# Patient Record
Sex: MALE | Race: WHITE | NOT HISPANIC OR LATINO | Employment: OTHER | URBAN - METROPOLITAN AREA
[De-identification: names, ages, dates, MRNs, and addresses within clinical notes are randomized per-mention and may not be internally consistent; named-entity substitution may affect disease eponyms.]

---

## 2018-01-10 NOTE — PATIENT DISCUSSION
keep appt with Derek. OCT today shows good resolution of fluid without changes from up [address]. Poss maintance dose. Hold on YAG OD for now.

## 2018-01-12 NOTE — PATIENT DISCUSSION
No fluid on exam today. Recommend treat and extend. Pt will return in 2 weeks for Eylea injection #3.

## 2018-01-24 NOTE — PROCEDURE NOTE: CLINICAL
PROCEDURE NOTE: Eylea #3 OS. Diagnosis: Neovascular AMD with Inactive CNV. Prep: Betadine Drops and Betadine Scrub. Prior to injection, risks/benefits/alternatives discussed including corneal abrasion, infection, loss of vision, hemorrhage, cataract, glaucoma, retinal tears or detachment. A written consent is on file, and the need for today’s injection was discussed and the patient is understanding and wishes to proceed. The entire vial of Eylea was drawn up into a syringe. The opened vial, remaining drug, and filtered needle were disposed of in a certified biohazard container. Betadine prep was performed. Topical anesthesia was induced with Alcaine. 4% lidocaine pledge. A lid speculum was used. A short 30g needle on a 1cc syringe was used with product that that had previously been prepared under sterile conditions. Injection site: 3-4 mm from the limbus. The used syringe/needle was transferred to a biohazard container. Lid speculum removed. Mask worn during procedure. Patient tolerated procedure well. Count fingers vision was verified. There were no complications. Patient was given the standard instruction sheet. Patient given office phone number/answering service number and advised to call immediately should there be loss of vision or pain, or should they have any other questions or concerns. Dariel Pope

## 2018-03-09 NOTE — PATIENT DISCUSSION
Pt edu still inactive. Due to high tendency to return and chronic condition Recc Augustina # 4 in: 2-3 weeks.

## 2018-03-28 NOTE — PATIENT DISCUSSION
Recommended injection #4 of Eylea. The injection was given and tolerated well by patient. Post-injection instructions were reviewed and understood by the patient.

## 2018-03-28 NOTE — PROCEDURE NOTE: CLINICAL
PROCEDURE NOTE: Eylea #4 OS. Diagnosis: Neovascular AMD with Inactive CNV. Prep: Betadine Drops and Betadine Scrub. Prior to injection, risks/benefits/alternatives discussed including corneal abrasion, infection, loss of vision, hemorrhage, cataract, glaucoma, retinal tears or detachment. A written consent is on file, and the need for today’s injection was discussed and the patient is understanding and wishes to proceed. The entire vial of Eylea was drawn up into a syringe. The opened vial, remaining drug, and filtered needle were disposed of in a certified biohazard container. Betadine prep was performed. Topical anesthesia was induced with Alcaine. 4% lidocaine pledge. A lid speculum was used. A short 30g needle on a 1cc syringe was used with product that that had previously been prepared under sterile conditions. Injection site: 3-4 mm from the limbus. The used syringe/needle was transferred to a biohazard container. Lid speculum removed. Mask worn during procedure. Patient tolerated procedure well. Count fingers vision was verified. There were no complications. Patient was given the standard instruction sheet. Patient given office phone number/answering service number and advised to call immediately should there be loss of vision or pain, or should they have any other questions or concerns. Ewelina Hansen

## 2018-04-30 NOTE — PATIENT DISCUSSION
okay to continue Lotemax Qday OD indefinitely ; mid stromal Cholesterol deposits but no active infiltrates . NO ACTIVITY TODAY.

## 2018-05-17 NOTE — PATIENT DISCUSSION
Pt leaving to go [facility] May 19th. See's Dr. Merlene Olson @ [facility]. Copy of records given to patient.

## 2018-05-17 NOTE — PATIENT DISCUSSION
No activity seen on exam today. Okay to continue Lotemax Qday OD indefinitely for any pain or discomfort; mid stromal Cholesterol deposits but no active infiltrates .

## 2018-05-17 NOTE — PATIENT DISCUSSION
Pt edu still inactive. No treatment recommended before patient leaves for [place]. Recommend seeing Dr. Giulia Palumbo as scheduled next week. May need treatment in future if worsens. Stressed chronic condition, no cure.

## 2018-11-08 NOTE — PATIENT DISCUSSION
Pt edu still inactive. Do not have records from [facility]. Pt states she received 3 injections [address] about 12 weeks apart. Will continue with same interval. Will schedule a series of 2 Eylea, 12 weeks apart.

## 2018-11-17 NOTE — PATIENT DISCUSSION
Recommended OBSERVATION. normal... Well appearing, well nourished, awake, alert, oriented to person, place, time/situation and in no apparent distress.

## 2019-01-16 NOTE — PATIENT DISCUSSION
Recommended injection of Eylea #8 (1 of 2). The injection was given and tolerated well by patient. Post-injection instructions were reviewed and understood by the patient.

## 2019-01-16 NOTE — PROCEDURE NOTE: CLINICAL
PROCEDURE NOTE: Eylea 2mg ( 1 of 2) #8 OS. Diagnosis: Neovascular AMD with Inactive CNV. Prior to injection, risks/benefits/alternatives discussed including corneal abrasion, infection, loss of vision, hemorrhage, cataract, glaucoma, retinal tears or detachment. A written consent is on file, and the need for today’s injection was discussed and the patient is understanding and wishes to proceed. The entire vial of Eylea was drawn up into a syringe. The opened vial, remaining drug, and filtered needle were disposed of in a certified biohazard container. Betadine prep was performed. Topical anesthesia was induced with Alcaine. 4% lidocaine pledge. A lid speculum was used. A short 30g needle on a 1cc syringe was used with product that that had previously been prepared under sterile conditions. Injection site: 3-4 mm from the limbus. The used syringe/needle was transferred to a biohazard container. Lid speculum removed. Mask worn during procedure. Patient tolerated procedure well. Count fingers vision was verified. There were no complications. Patient was given the standard instruction sheet. Patient given office phone number/answering service number and advised to call immediately should there be loss of vision or pain, or should they have any other questions or concerns. John Guzmán

## 2019-04-10 NOTE — PATIENT DISCUSSION
Pt here for injection of Eylea #9 (2 of 2). The injection was given and tolerated well by patient. Post-injection instructions were reviewed and understood by the patient.

## 2019-04-10 NOTE — PATIENT DISCUSSION
Pt has appt in July to see her up [address], Dr. Kaleigh Davies that is too far out, rec pt f/u with Dr. Silvestre Santos in 2 months or sooner PRN.

## 2019-04-10 NOTE — PROCEDURE NOTE: CLINICAL
PROCEDURE NOTE: Eylea 2mg ( 2 of 2) #9 OS. Diagnosis: Neovascular AMD with Inactive CNV. Prior to injection, risks/benefits/alternatives discussed including corneal abrasion, infection, loss of vision, hemorrhage, cataract, glaucoma, retinal tears or detachment. A written consent is on file, and the need for today’s injection was discussed and the patient is understanding and wishes to proceed. The entire vial of Eylea was drawn up into a syringe. The opened vial, remaining drug, and filtered needle were disposed of in a certified biohazard container. Betadine prep was performed. Topical anesthesia was induced with Alcaine. 4% lidocaine pledge. A lid speculum was used. A short 30g needle on a 1cc syringe was used with product that that had previously been prepared under sterile conditions. Injection site: 3-4 mm from the limbus. The used syringe/needle was transferred to a biohazard container. Lid speculum removed. Mask worn during procedure. Patient tolerated procedure well. Count fingers vision was verified. There were no complications. Patient was given the standard instruction sheet. Patient given office phone number/answering service number and advised to call immediately should there be loss of vision or pain, or should they have any other questions or concerns. [facility]#[id number] exp 10/2019.

## 2019-11-07 NOTE — PATIENT DISCUSSION
Discussed Anti-VEGF treatment risks/benefits/alternatives including continuing current treatment interval vs extension vs observation.

## 2019-11-07 NOTE — PATIENT DISCUSSION
The patient notes gradual worsening of visual acuity. This is likely due to progressive atrophy and unfortunately is not treatable.  Pt will need extra light when reading and encouraged to use Tameka.

## 2019-11-20 NOTE — PROCEDURE NOTE: CLINICAL
PROCEDURE NOTE: Eylea 2mg (1 of 2) #13 OS. Diagnosis: Neovascular AMD with Active CNV. Prior to injection, risks/benefits/alternatives discussed including corneal abrasion, infection, loss of vision, hemorrhage, cataract, glaucoma, retinal tears or detachment. A written consent is on file, and the need for today’s injection was discussed and the patient is understanding and wishes to proceed. The entire vial of Eylea was drawn up into a syringe. The opened vial, remaining drug, and filtered needle were disposed of in a certified biohazard container. Betadine prep was performed. Topical anesthesia was induced with Alcaine. 4% lidocaine pledge. A lid speculum was used. A short 30g needle on a 1cc syringe was used with product that that had previously been prepared under sterile conditions. Injection site: 3-4 mm from the limbus. The used syringe/needle was transferred to a biohazard container. Lid speculum removed. Mask worn during procedure. Patient tolerated procedure well. Count fingers vision was verified. There were no complications. Patient was given the standard instruction sheet. Patient given office phone number/answering service number and advised to call immediately should there be loss of vision or pain, or should they have any other questions or concerns. Partha Pritchett

## 2020-01-08 NOTE — PROCEDURE NOTE: CLINICAL
PROCEDURE NOTE: Eylea Prefilled Syringe (2 of 2) #14 OS. Diagnosis: Neovascular AMD with Active CNV. Prior to injection, risks/benefits/alternatives discussed including corneal abrasion, infection, loss of vision, hemorrhage, cataract, glaucoma, retinal tears or detachment. A written consent is on file, and the need for today's injection was discussed and the patient is understanding and wishes to proceed. A 30G needle was placed on an Eylea 2mg/0.05ml Pre-filled Syringe. Betadine prep was performed. Topical anesthesia was induced with Alcaine. 4% lidocaine pledge. A lid speculum was used. An intravitreal injection of Eylea was given. Injection site: 3-4 mm from the limbus. The used syringe/needle was transferred to a biohazard container. Lid speculum removed. Mask worn during procedure. Patient tolerated procedure well. Count fingers vision was verified. There were no complications. Patient was given the standard instruction sheet. Jessica Velazquez PROCEDURE NOTE: A/C Paracentesis #1 OS. Prior to treatment, the risks/benefits/alternatives were discussed. The patient wished to proceed with procedure. Location of Tap: Temporal Limbus. The eye was prepped with topical Betadine 5%, a sterile lid speculum was placed in the eye. Volume of tap: 0.1 ml. Patient tolerated procedure well. There were no complications. Post procedure instructions given. Patient given office phone number/answering service number and advised to call immediately should there be an increase in floaters or redness, loss of vision or pain, or should they have any other questions or concerns. Post inj paracentisis done today. Jessica Velazquez

## 2020-01-08 NOTE — PATIENT DISCUSSION
Pt here for Augustina #14(2 of 2) today.  Post inj instructions edu.  Post inj paracentisis done.  Pt given Zylet sample TID until sample bottle gone to prevent inflammation/infection.

## 2020-01-08 NOTE — PATIENT DISCUSSION
Post inj paracentisis done.  Pt given Zylet sample TID until sample bottle gone to prevent inflammation/infection.

## 2020-03-04 NOTE — PATIENT DISCUSSION
Pt edu improved. Central cysts have resolved since being treated with Eylea. Edu pt reason for decrease in vision is due to atrophy which unfortunately is not treatable. Recommend holding off on treatment since too many treatments can also increase atrophy and has a Hx of increased IOP following injections. Will watch for now and only treat if worsens. Will follow up in 6 weeks. Advised pt to call with any vision changes.

## 2020-04-22 NOTE — PATIENT DISCUSSION
Recommended series of 2 x 6 weeks apart.  Pt will be leaving to return to [place] on May 7th.  Will forward records to Dr. Alfonso Chappell.

## 2020-04-22 NOTE — PATIENT DISCUSSION
***post exam, Dr. Adrienne Tomlinson office is closed due to COVID.  Pt requested records to be sent to Dr. Basilio Weiss, ph [phone], fax [phone]***.

## 2020-04-22 NOTE — PROCEDURE NOTE: CLINICAL
PROCEDURE NOTE: Eylea Prefilled Syringe 2mg #15 OS. Diagnosis: Neovascular AMD with Active CNV. Prior to injection, risks/benefits/alternatives discussed including corneal abrasion, infection, loss of vision, hemorrhage, cataract, glaucoma, retinal tears or detachment. A written consent is on file, and the need for today's injection was discussed and the patient is understanding and wishes to proceed. A 30G needle was placed on an Eylea 2mg/0.05ml Pre-filled Syringe. Betadine prep was performed. Topical anesthesia was induced with Alcaine. 4% lidocaine pledge. A lid speculum was used. An intravitreal injection of Eylea was given. Injection site: 3-4 mm from the limbus. The used syringe/needle was transferred to a biohazard container. Lid speculum removed. Mask worn during procedure. Patient tolerated procedure well. Count fingers vision was verified. There were no complications. Patient was given the standard instruction sheet. Lot #[id number], exp 8/31/2020.

## 2020-04-22 NOTE — PATIENT DISCUSSION
Limited exam today due to COVID precautions.  Pt edu no fluid/edema seen on OCT today.  Pt stated she has noticed a decrease in vision since last visit.  Recommended Eylea #15 injection today.  Pt elects to proceed.  The injection was administered without complication.  Post-injection instructions were reviewed and understood by pt.

## 2020-10-07 ENCOUNTER — NEW PATIENT COMPREHENSIVE (OUTPATIENT)
Dept: URBAN - METROPOLITAN AREA CLINIC 35 | Facility: CLINIC | Age: 70
End: 2020-10-07

## 2020-10-07 DIAGNOSIS — H40.033: ICD-10-CM

## 2020-10-07 DIAGNOSIS — H25.813: ICD-10-CM

## 2020-10-07 DIAGNOSIS — H40.013: ICD-10-CM

## 2020-10-07 DIAGNOSIS — H52.7: ICD-10-CM

## 2020-10-07 PROCEDURE — 92015 DETERMINE REFRACTIVE STATE: CPT

## 2020-10-07 PROCEDURE — 92004 COMPRE OPH EXAM NEW PT 1/>: CPT

## 2020-10-07 ASSESSMENT — VISUAL ACUITY
OS_SC: 20/50-2
OS_SC: J12
OS_PH: 20/25
OD_PH: 20/20-2
OD_SC: 20/40-1
OD_SC: J6

## 2020-10-07 ASSESSMENT — TONOMETRY
OS_IOP_MMHG: 18
OD_IOP_MMHG: 18

## 2021-01-08 NOTE — PATIENT DISCUSSION
Pt edu no fluid/edema seen on OCT or exam today.  No treatment needed today.  Will monitor closely. RTC in 6 weeks.

## 2021-02-19 NOTE — PATIENT DISCUSSION
Pt edu no fluid/edema seen on OCT or exam today.  No treatment needed today.  Will monitor closely. Will follow up in 7 weeks. Goal is to extend interval between exams/treatments. Advised pt to call with any vision changes.

## 2021-04-16 NOTE — PROCEDURE NOTE: CLINICAL
PROCEDURE NOTE: Eylea 2mg #19 OS. Diagnosis: Neovascular AMD with Active CNV. Prep: Betadine Drops and Betadine Scrub. Prior to injection, risks/benefits/alternatives discussed including corneal abrasion, infection, loss of vision, hemorrhage, cataract, glaucoma, retinal tears or detachment. A written consent is on file, and the need for today’s injection was discussed and the patient is understanding and wishes to proceed. The entire vial of Eylea was drawn up into a syringe. The opened vial, remaining drug, and filtered needle were disposed of in a certified biohazard container. Betadine prep was performed. Topical anesthesia was induced with Alcaine. 4% lidocaine pledge. A lid speculum was used. A short 30g needle on a 1cc syringe was used with product that that had previously been prepared under sterile conditions. Injection site: 3-4 mm from the limbus. The used syringe/needle was transferred to a biohazard container. Lid speculum removed. Mask worn during procedure. Patient tolerated procedure well. Count fingers vision was verified. There were no complications. Patient was given the standard instruction sheet. Patient given office phone number/answering service number and advised to call immediately should there be loss of vision or pain, or should they have any other questions or concerns. Keny Clement

## 2021-04-16 NOTE — PATIENT DISCUSSION
+ new tr edema, rec Eylea #19 today.  pt leaving for [facility] and rec see Dr. Caitlin Phelps in 3 months or sooner prn. Advised pt to call with any vision changes.

## 2021-05-07 NOTE — PATIENT DISCUSSION
NO new fluid and doing well. Follow with Stevan.  pt leaving for [facility] and rec see Dr. Mahesh Ramirez in 3 months or sooner prn. Advised pt to call with any vision changes.

## 2022-02-03 NOTE — PATIENT DISCUSSION
no fluid today, pt's cause of visual disturbance is from [place] Atrophy.  Pt will need to scan when reading and watching tv.

## 2022-02-03 NOTE — PATIENT DISCUSSION
Based on the OCT findings, I recommend a “treat and extend” protocol, and we will extend the injection interval to 6-7 weeks(from last inj)  RTC in 3-4 weeks for Eylea OS.

## 2022-03-02 NOTE — PROCEDURE NOTE: CLINICAL
PROCEDURE NOTE: Eylea 2mg #22 OS. Diagnosis: Neovascular AMD with Active CNV. Prep: Betadine Drops and Betadine Scrub. Prior to injection, risks/benefits/alternatives discussed including corneal abrasion, infection, loss of vision, hemorrhage, cataract, glaucoma, retinal tears or detachment. A written consent is on file, and the need for today’s injection was discussed and the patient is understanding and wishes to proceed. The entire vial of Eylea was drawn up into a syringe. The opened vial, remaining drug, and filtered needle were disposed of in a certified biohazard container. Betadine prep was performed. Topical anesthesia was induced with Alcaine. 4% lidocaine pledge. A lid speculum was used. A short 30g needle on a 1cc syringe was used with product that that had previously been prepared under sterile conditions. Injection site: 3-4 mm from the limbus. The used syringe/needle was transferred to a biohazard container. Lid speculum removed. Mask worn during procedure. Patient tolerated procedure well. Count fingers vision was verified. There were no complications. Patient was given the standard instruction sheet. Patient given office phone number/answering service number and advised to call immediately should there be loss of vision or pain, or should they have any other questions or concerns. Danelle Umanzor

## 2022-04-11 NOTE — PATIENT DISCUSSION
RNFL THINNING, REC RESTARTING ON LAT QHS OD--WILL HAVE [facility] CHECK IOP IN 6 WEEKS.

## 2022-04-14 NOTE — PROCEDURE NOTE: CLINICAL
PROCEDURE NOTE: Eylea Prefilled Syringe 2mg #23 OS. Diagnosis: Neovascular AMD with Active CNV. Prep: Betadine Drops and Betadine Scrub. Prior to injection, risks/benefits/alternatives discussed including corneal abrasion, infection, loss of vision, hemorrhage, cataract, glaucoma, retinal tears or detachment. A written consent is on file, and the need for today's injection was discussed and the patient is understanding and wishes to proceed. A 30G needle was placed on an Eylea 2mg/0.05ml Pre-filled Syringe. Betadine prep was performed. Topical anesthesia was induced with Alcaine. 4% lidocaine pledge. A lid speculum was used. An intravitreal injection of Eylea was given. Injection site: 3-4 mm from the limbus. The used syringe/needle was transferred to a biohazard container. Lid speculum removed. Mask worn during procedure. Patient tolerated procedure well. Count fingers vision was verified. There were no complications. Patient was given the standard instruction sheet. Lars Fernando

## 2022-04-14 NOTE — PATIENT DISCUSSION
Decision to treat was made based on today's clinical findings.  + active CNV, no fluid or blood but risk of worsening without tx.  Cont w/ Eylea today and will see her retina specialist when gets up [facility].

## 2022-11-17 NOTE — PROCEDURE NOTE: CLINICAL

## 2022-12-09 NOTE — PROCEDURE NOTE: CLINICAL
Orders placed.   PROCEDURE NOTE: Eylea Prefilled Syringe (2 of 2) #14 OS. Diagnosis: Neovascular AMD with Active CNV. Prior to injection, risks/benefits/alternatives discussed including corneal abrasion, infection, loss of vision, hemorrhage, cataract, glaucoma, retinal tears or detachment. A written consent is on file, and the need for today's injection was discussed and the patient is understanding and wishes to proceed. A 30G needle was placed on an Eylea 2mg/0.05ml Pre-filled Syringe. Betadine prep was performed. Topical anesthesia was induced with Alcaine. 4% lidocaine pledge. A lid speculum was used. An intravitreal injection of Eylea was given. Injection site: 3-4 mm from the limbus. The used syringe/needle was transferred to a biohazard container. Lid speculum removed. Mask worn during procedure. Patient tolerated procedure well. Count fingers vision was verified. There were no complications. Patient was given the standard instruction sheet. Jessica Velazquez PROCEDURE NOTE: A/C Paracentesis #1 OS. Prior to treatment, the risks/benefits/alternatives were discussed. The patient wished to proceed with procedure. Location of Tap: Temporal Limbus. The eye was prepped with topical Betadine 5%, a sterile lid speculum was placed in the eye. Volume of tap: 0.1 ml. Patient tolerated procedure well. There were no complications. Post procedure instructions given. Patient given office phone number/answering service number and advised to call immediately should there be an increase in floaters or redness, loss of vision or pain, or should they have any other questions or concerns. Post inj paracentisis done today. Jessica Velazquez

## 2023-01-19 NOTE — PATIENT DISCUSSION
[facility]S offered future patient followup options of [facility] vs [facility]; patient elects [facility].

## 2023-01-19 NOTE — PROCEDURE NOTE: CLINICAL
PROCEDURE NOTE: Eylea Prefilled Syringe 2mg OS. Diagnosis: Neovascular AMD with Active CNV. Anesthesia: Topical. Prep: Betadine Drops and Betadine Scrub. The patient was identified by the physician. The risks, benefits, alternatives, and possible complications of the procedure were discussed with the patient and informed consent was obtained. The procedure eye was marked with a sticker. The patient was positioned in the chair. A sterile small gauge needle on the medication syringe entered the vitreous cavity 3.0-3.5mm from the limbus and the medication was administered without complication. The speculum was removed. The eye was irrigated with sterile eye rinse solution. The patient was instructed to call immediately for any visual loss, swelling, discharge, or pain after the intravitreal injection. Patient tolerated the procedure well. Vision was checked. Post procedure instructions given. Liberty Abarca

## 2023-07-03 NOTE — PATIENT DISCUSSION
The IOP is in the target range. [Well groomed] : well groomed [Average] : average [Cooperative] : cooperative [Anxious] : anxious [Full] : full [Rapid] : rapid [Linear/Goal Directed] : linear/goal directed [WNL] : within normal limits [FreeTextEntry8] : frustrated [de-identified] : tearful at times
